# Patient Record
Sex: MALE | Race: WHITE
[De-identification: names, ages, dates, MRNs, and addresses within clinical notes are randomized per-mention and may not be internally consistent; named-entity substitution may affect disease eponyms.]

---

## 2019-06-01 ENCOUNTER — HOSPITAL ENCOUNTER (EMERGENCY)
Dept: HOSPITAL 54 - ER | Age: 34
LOS: 1 days | Discharge: HOME | End: 2019-06-02
Payer: COMMERCIAL

## 2019-06-01 VITALS — HEIGHT: 74 IN | WEIGHT: 225 LBS | BODY MASS INDEX: 28.88 KG/M2

## 2019-06-01 DIAGNOSIS — S61.210A: ICD-10-CM

## 2019-06-01 DIAGNOSIS — S61.214A: Primary | ICD-10-CM
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
## 2019-06-01 DIAGNOSIS — Z60.2: ICD-10-CM
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
## 2019-06-01 PROCEDURE — 90715 TDAP VACCINE 7 YRS/> IM: CPT

## 2019-06-01 PROCEDURE — 12002 RPR S/N/AX/GEN/TRNK2.6-7.5CM: CPT

## 2019-06-01 PROCEDURE — A6402 STERILE GAUZE <= 16 SQ IN: HCPCS

## 2019-06-01 PROCEDURE — 90471 IMMUNIZATION ADMIN: CPT

## 2019-06-01 PROCEDURE — 99284 EMERGENCY DEPT VISIT MOD MDM: CPT

## 2019-06-01 SDOH — SOCIAL STABILITY - SOCIAL INSECURITY: PROBLEMS RELATED TO LIVING ALONE: Z60.2
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
## 2019-06-02 VITALS — DIASTOLIC BLOOD PRESSURE: 88 MMHG | SYSTOLIC BLOOD PRESSURE: 158 MMHG

## 2019-06-02 NOTE — NUR
WOUND CLEANSED WITH NS. COVER NON ADHERENT DRESSING AND WRAPPED WITH KERLIX. 
INTRUCTION PROVIDED FOR WOUND CARE

## 2019-06-02 NOTE — NUR
Patient discharged to home in stable condition. Written and verbal after care 
instructions given. Patient verbalizes understanding of instruction.Pt 
ambulatory with a steady gait